# Patient Record
Sex: FEMALE | Race: OTHER | Employment: UNEMPLOYED | URBAN - METROPOLITAN AREA
[De-identification: names, ages, dates, MRNs, and addresses within clinical notes are randomized per-mention and may not be internally consistent; named-entity substitution may affect disease eponyms.]

---

## 2024-02-03 ENCOUNTER — APPOINTMENT (OUTPATIENT)
Dept: GENERAL RADIOLOGY | Age: 39
End: 2024-02-03

## 2024-02-03 ENCOUNTER — HOSPITAL ENCOUNTER (EMERGENCY)
Age: 39
Discharge: HOME | End: 2024-02-03

## 2024-02-03 VITALS
OXYGEN SATURATION: 100 % | HEIGHT: 62 IN | BODY MASS INDEX: 27.6 KG/M2 | TEMPERATURE: 98.7 F | WEIGHT: 150 LBS | DIASTOLIC BLOOD PRESSURE: 80 MMHG | RESPIRATION RATE: 16 BRPM | SYSTOLIC BLOOD PRESSURE: 104 MMHG | HEART RATE: 103 BPM

## 2024-02-03 DIAGNOSIS — R07.89 CHEST WALL PAIN: Primary | ICD-10-CM

## 2024-02-03 DIAGNOSIS — K21.9 GASTROESOPHAGEAL REFLUX DISEASE WITHOUT ESOPHAGITIS: ICD-10-CM

## 2024-02-03 LAB
ALBUMIN SERPL-MCNC: 3.7 G/DL (ref 3.5–5)
ALBUMIN/GLOB SERPL: 0.8 (ref 0.4–1.6)
ALP SERPL-CCNC: 96 U/L (ref 50–136)
ALT SERPL-CCNC: 56 U/L (ref 12–65)
ANION GAP SERPL CALC-SCNC: 2 MMOL/L (ref 2–11)
APPEARANCE UR: NORMAL
AST SERPL-CCNC: 23 U/L (ref 15–37)
BASOPHILS # BLD: 0.1 K/UL (ref 0–0.2)
BASOPHILS NFR BLD: 1 % (ref 0–2)
BILIRUB SERPL-MCNC: 0.3 MG/DL (ref 0.2–1.1)
BILIRUB UR QL: NEGATIVE
BUN SERPL-MCNC: 9 MG/DL (ref 6–23)
CALCIUM SERPL-MCNC: 8.7 MG/DL (ref 8.3–10.4)
CHLORIDE SERPL-SCNC: 111 MMOL/L (ref 103–113)
CO2 SERPL-SCNC: 26 MMOL/L (ref 21–32)
COLOR UR: NORMAL
CREAT SERPL-MCNC: 0.7 MG/DL (ref 0.6–1)
D DIMER PPP FEU-MCNC: <0.27 UG/ML(FEU)
DIFFERENTIAL METHOD BLD: ABNORMAL
EKG ATRIAL RATE: 91 BPM
EKG DIAGNOSIS: NORMAL
EKG P AXIS: 65 DEGREES
EKG P-R INTERVAL: 154 MS
EKG Q-T INTERVAL: 360 MS
EKG QRS DURATION: 80 MS
EKG QTC CALCULATION (BAZETT): 442 MS
EKG R AXIS: 93 DEGREES
EKG T AXIS: 63 DEGREES
EKG VENTRICULAR RATE: 91 BPM
EOSINOPHIL # BLD: 0.1 K/UL (ref 0–0.8)
EOSINOPHIL NFR BLD: 1 % (ref 0.5–7.8)
ERYTHROCYTE [DISTWIDTH] IN BLOOD BY AUTOMATED COUNT: 13.5 % (ref 11.9–14.6)
GLOBULIN SER CALC-MCNC: 4.4 G/DL (ref 2.8–4.5)
GLUCOSE SERPL-MCNC: 110 MG/DL (ref 65–100)
GLUCOSE UR STRIP.AUTO-MCNC: NEGATIVE MG/DL
HCG UR QL: NEGATIVE
HCT VFR BLD AUTO: 43.1 % (ref 35.8–46.3)
HGB BLD-MCNC: 14 G/DL (ref 11.7–15.4)
HGB UR QL STRIP: NEGATIVE
IMM GRANULOCYTES # BLD AUTO: 0 K/UL (ref 0–0.5)
IMM GRANULOCYTES NFR BLD AUTO: 0 % (ref 0–5)
KETONES UR QL STRIP.AUTO: NEGATIVE MG/DL
LEUKOCYTE ESTERASE UR QL STRIP.AUTO: NEGATIVE
LIPASE SERPL-CCNC: 108 U/L (ref 73–393)
LYMPHOCYTES # BLD: 3.4 K/UL (ref 0.5–4.6)
LYMPHOCYTES NFR BLD: 32 % (ref 13–44)
MCH RBC QN AUTO: 27.8 PG (ref 26.1–32.9)
MCHC RBC AUTO-ENTMCNC: 32.5 G/DL (ref 31.4–35)
MCV RBC AUTO: 85.5 FL (ref 82–102)
MONOCYTES # BLD: 0.7 K/UL (ref 0.1–1.3)
MONOCYTES NFR BLD: 6 % (ref 4–12)
NEUTS SEG # BLD: 6.4 K/UL (ref 1.7–8.2)
NEUTS SEG NFR BLD: 60 % (ref 43–78)
NITRITE UR QL STRIP.AUTO: NEGATIVE
NRBC # BLD: 0 K/UL (ref 0–0.2)
PH UR STRIP: 5 (ref 5–9)
PLATELET # BLD AUTO: 528 K/UL (ref 150–450)
PMV BLD AUTO: 10.7 FL (ref 9.4–12.3)
POTASSIUM SERPL-SCNC: 3.6 MMOL/L (ref 3.5–5.1)
PROT SERPL-MCNC: 8.1 G/DL (ref 6.3–8.2)
PROT UR STRIP-MCNC: NEGATIVE MG/DL
RBC # BLD AUTO: 5.04 M/UL (ref 4.05–5.2)
SODIUM SERPL-SCNC: 139 MMOL/L (ref 136–146)
SP GR UR REFRACTOMETRY: 1.02 (ref 1–1.02)
TROPONIN I SERPL HS-MCNC: <3 PG/ML (ref 0–14)
UROBILINOGEN UR QL STRIP.AUTO: 0.2 EU/DL (ref 0.2–1)
WBC # BLD AUTO: 10.8 K/UL (ref 4.3–11.1)

## 2024-02-03 PROCEDURE — 93010 ELECTROCARDIOGRAM REPORT: CPT | Performed by: INTERNAL MEDICINE

## 2024-02-03 PROCEDURE — 84484 ASSAY OF TROPONIN QUANT: CPT

## 2024-02-03 PROCEDURE — 85025 COMPLETE CBC W/AUTO DIFF WBC: CPT

## 2024-02-03 PROCEDURE — 6360000002 HC RX W HCPCS: Performed by: PHYSICIAN ASSISTANT

## 2024-02-03 PROCEDURE — 99285 EMERGENCY DEPT VISIT HI MDM: CPT

## 2024-02-03 PROCEDURE — 6370000000 HC RX 637 (ALT 250 FOR IP): Performed by: PHYSICIAN ASSISTANT

## 2024-02-03 PROCEDURE — 71046 X-RAY EXAM CHEST 2 VIEWS: CPT

## 2024-02-03 PROCEDURE — 81003 URINALYSIS AUTO W/O SCOPE: CPT

## 2024-02-03 PROCEDURE — 85379 FIBRIN DEGRADATION QUANT: CPT

## 2024-02-03 PROCEDURE — 96374 THER/PROPH/DIAG INJ IV PUSH: CPT

## 2024-02-03 PROCEDURE — 81025 URINE PREGNANCY TEST: CPT

## 2024-02-03 PROCEDURE — 80053 COMPREHEN METABOLIC PANEL: CPT

## 2024-02-03 PROCEDURE — 83690 ASSAY OF LIPASE: CPT

## 2024-02-03 PROCEDURE — 93005 ELECTROCARDIOGRAM TRACING: CPT | Performed by: EMERGENCY MEDICINE

## 2024-02-03 RX ORDER — OMEPRAZOLE 20 MG/1
20 CAPSULE, DELAYED RELEASE ORAL
Qty: 30 CAPSULE | Refills: 0 | Status: SHIPPED | OUTPATIENT
Start: 2024-02-03

## 2024-02-03 RX ORDER — FAMOTIDINE 20 MG/1
20 TABLET, FILM COATED ORAL
Status: COMPLETED | OUTPATIENT
Start: 2024-02-03 | End: 2024-02-03

## 2024-02-03 RX ORDER — KETOROLAC TROMETHAMINE 30 MG/ML
30 INJECTION, SOLUTION INTRAMUSCULAR; INTRAVENOUS
Status: COMPLETED | OUTPATIENT
Start: 2024-02-03 | End: 2024-02-03

## 2024-02-03 RX ADMIN — KETOROLAC TROMETHAMINE 30 MG: 30 INJECTION, SOLUTION INTRAMUSCULAR; INTRAVENOUS at 12:07

## 2024-02-03 RX ADMIN — FAMOTIDINE 20 MG: 20 TABLET, FILM COATED ORAL at 12:47

## 2024-02-03 ASSESSMENT — PAIN DESCRIPTION - LOCATION: LOCATION: CHEST

## 2024-02-03 ASSESSMENT — PAIN SCALES - GENERAL
PAINLEVEL_OUTOF10: 6
PAINLEVEL_OUTOF10: 8

## 2024-02-03 ASSESSMENT — LIFESTYLE VARIABLES
HOW MANY STANDARD DRINKS CONTAINING ALCOHOL DO YOU HAVE ON A TYPICAL DAY: PATIENT DOES NOT DRINK
HOW OFTEN DO YOU HAVE A DRINK CONTAINING ALCOHOL: NEVER

## 2024-02-03 ASSESSMENT — PAIN DESCRIPTION - DESCRIPTORS: DESCRIPTORS: ACHING

## 2024-02-03 ASSESSMENT — PAIN - FUNCTIONAL ASSESSMENT: PAIN_FUNCTIONAL_ASSESSMENT: NONE - DENIES PAIN

## 2024-02-03 NOTE — ED PROVIDER NOTES
Emergency Department Provider Note       PCP: No primary care provider on file.   Age: 38 y.o.   Sex: female     DISPOSITION Decision To Discharge 02/03/2024 12:36:52 PM       ICD-10-CM    1. Chest wall pain  R07.89       2. Gastroesophageal reflux disease without esophagitis  K21.9           Medical Decision Making     Complexity of Problems Addressed:  1 or more acute illnesses that pose a threat to life or bodily function.     Data Reviewed and Analyzed:   I independently ordered and reviewed each unique test.         I independently ordered and interpreted the ED EKG in the absence of a Cardiologist.    Rate: 91  EKG Interpretation: EKG Interpretation: sinus rhythm, no evidence of arrhythmia  ST Segments: Normal ST segments - NO STEMI      I interpreted the X-rays chest x ray negative.  I interpreted the CBC CMP lipase D-dimer urinalysis pregnancy test and troponin all normal.    Discussion of management or test interpretation.  38-year-old female with what appears to be musculoskeletal chest pain and possibly some mild gastritis.  She was given Toradol IV in the ER with some relief of her pain she was also given oral Pepcid.  Will give prescription for daily Prilosec strict return to ED precautions and referral to primary care           Risk of Complications and/or Morbidity of Patient Management:  Prescription drug management performed.        Is this patient to be included in the SEP-1 core measure due to severe sepsis or septic shock? No Exclusion criteria - the patient is NOT to be included for SEP-1 Core Measure due to: Infection is not suspected      History      Patient to ER complaining of midsternal chest pain/burning for the past week.  Worse with deep breathing and motion.  She is using Tylenol with minimal relief.  She denies any cough congestion no fever.  She denies any hormone use smoking or recent travel.  She states she will have some mild upper stomach discomfort when she eats.  She denies

## 2024-02-03 NOTE — ED TRIAGE NOTES
Pt arrives to ER with c/o chest pain x week. Pt states that pain originates in chest and travels up into the neck. No cardiac hx.

## 2024-02-03 NOTE — DISCHARGE INSTR - COC
Continuity of Care Form    Patient Name: Dolores Hollis   :  1985  MRN:  [id number]    Admit date:  2/3/2024  Discharge date:  ***    Code Status Order: No Order   Advance Directives:     Admitting Physician:  No admitting provider for patient encounter.  PCP: No primary care provider on file.    Discharging Nurse: ***  Discharging Hospital Unit/Room#: ITR5/T05  Discharging Unit Phone Number: ***    Emergency Contact:   Extended Emergency Contact Information  Primary Emergency Contact: DustinJavan  Mobile Phone: [phone]  Relation: Friend   needed? Yes    Past Surgical History:  No past surgical history on file.    Immunization History:     There is no immunization history on file for this patient.    Active Problems:  Patient Active Problem List   Diagnosis Code    Chest wall pain R07.89    Gastroesophageal reflux disease without esophagitis K21.9       Isolation/Infection:   Isolation            No Isolation          Patient Infection Status       None to display            Nurse Assessment:  Last Vital Signs: /60   Pulse 90   Temp 98.7 °F (37.1 °C) (Oral)   Resp 16   Ht 1.575 m (5' 2\")   Wt 68 kg (150 lb)   SpO2 99%   BMI 27.44 kg/m²     Last documented pain score (0-10 scale): Pain Level: 8  Last Weight:   Wt Readings from Last 1 Encounters:   24 68 kg (150 lb)     Mental Status:  {IP PT MENTAL STATUS:79266}    IV Access:  { DAVID IV ACCESS:677423282}    Nursing Mobility/ADLs:  Walking   {CHP DME ADLs:131658245}  Transfer  {CHP DME ADLs:853968169}  Bathing  {CHP DME ADLs:250432279}  Dressing  {CHP DME ADLs:339799267}  Toileting  {CHP DME ADLs:617742502}  Feeding  {CHP DME ADLs:264374614}  Med Admin  {CHP DME ADLs:403416080}  Med Delivery   { DAVID MED Delivery:582161884}    Wound Care Documentation and Therapy:        Elimination:  Continence:   Bowel: {YES / NO:}  Bladder: {YES / NO:}  Urinary Catheter: {Urinary Catheter:457449967}    Colostomy/Ileostomy/Ileal Conduit: {YES / NO:29869}       Date of Last BM: ***  No intake or output data in the 24 hours ending 24 1240  No intake/output data recorded.    Safety Concerns:     { DAVID Safety Concerns:398133693}    Impairments/Disabilities:      { DAVID Impairments/Disabilities:631032885}    Nutrition Therapy:  Current Nutrition Therapy:   { DAVID Diet List:986931257}    Routes of Feeding: {[facility] DME Other Feedings:084562131}  Liquids: {Slp liquid thickness:88954}  Daily Fluid Restriction: {[facility] DME Yes amt example:465496667}  Last Modified Barium Swallow with Video (Video Swallowing Test): {Done Not Done Date:047043613}    Treatments at the Time of Hospital Discharge:   Respiratory Treatments: ***  Oxygen Therapy:  {Therapy; copd oxygen:32009}  Ventilator:    { CC Vent List:852611713}    Rehab Therapies: {THERAPEUTIC INTERVENTION:0941277461}  Weight Bearing Status/Restrictions: {[facility] Weight Bearin}  Other Medical Equipment (for information only, NOT a DME order):  {EQUIPMENT:856466389}  Other Treatments: ***    Patient's personal belongings (please select all that are sent with patient):  {[facility] DME Belongings:248035822}    RN SIGNATURE:  {Esignature:791736761}    CASE MANAGEMENT/SOCIAL WORK SECTION    Inpatient Status Date: ***    Readmission Risk Assessment Score:  Readmission Risk              Risk of Unplanned Readmission:  0           Discharging to Facility/ Agency   Name:   Address:  Phone:  Fax:    Dialysis Facility (if applicable)   Name:  Address:  Dialysis Schedule:  Phone:  Fax:    / signature: {Esignature:872661928}    PHYSICIAN SECTION    Prognosis: {Prognosis:2343783242}    Condition at Discharge: { Patient Condition:958522220}    Rehab Potential (if transferring to Rehab): {Prognosis:6199114559}    Recommended Labs or Other Treatments After Discharge: ***    Physician Certification: I certify the above information and transfer of Dolores

## 2024-02-03 NOTE — ED NOTES
I have reviewed discharge instructions with the patient.  The patient verbalized understanding.    Patient left ED via Discharge Method: ambulatory to Home with family.    Opportunity for questions and clarification provided.       Patient given 1 scripts.         To continue your aftercare when you leave the hospital, you may receive an automated call from our care team to check in on how you are doing.  This is a free service and part of our promise to provide the best care and service to meet your aftercare needs.” If you have questions, or wish to unsubscribe from this service please call [phone].  Thank you for Choosing our [facility] Emergency Department.        Audrey Waller LPN  02/03/24 1257

## 2024-02-03 NOTE — DISCHARGE INSTRUCTIONS
Use prilosec 1 pill in am for pain along with tylenol daily call primary md office of choice or call number below to arrange follow up appt    We would love to help you get a primary care doctor for follow-up after your emergency department visit.    Please call [phone] between 7AM - 6PM Monday to Friday.  A care navigator will be able to assist you with setting up a doctor close to your home.

## 2025-06-27 ENCOUNTER — TRANSCRIBE ORDERS (OUTPATIENT)
Facility: HOSPITAL | Age: 40
End: 2025-06-27

## 2025-06-27 ENCOUNTER — HOSPITAL ENCOUNTER (OUTPATIENT)
Dept: PHYSICAL THERAPY | Age: 40
Setting detail: RECURRING SERIES
Discharge: HOME | End: 2025-06-29
Payer: COMMERCIAL

## 2025-06-27 DIAGNOSIS — M25.512 CHRONIC LEFT SHOULDER PAIN: Primary | ICD-10-CM

## 2025-06-27 DIAGNOSIS — G89.29 CHRONIC LEFT SHOULDER PAIN: Primary | ICD-10-CM

## 2025-06-27 DIAGNOSIS — S19.9XXA INJURY OF NECK, INITIAL ENCOUNTER: Primary | ICD-10-CM

## 2025-06-27 DIAGNOSIS — M54.59 OTHER LOW BACK PAIN: ICD-10-CM

## 2025-06-27 DIAGNOSIS — M62.81 MUSCLE WEAKNESS (GENERALIZED): ICD-10-CM

## 2025-06-27 DIAGNOSIS — M54.2 NECK PAIN: ICD-10-CM

## 2025-06-27 PROCEDURE — 97110 THERAPEUTIC EXERCISES: CPT

## 2025-06-27 PROCEDURE — 97162 PT EVAL MOD COMPLEX 30 MIN: CPT

## 2025-06-27 ASSESSMENT — PAIN SCALES - GENERAL: PAINLEVEL_OUTOF10: 9

## 2025-06-27 NOTE — CONSULTS
Session ID: [id number]  Session Duration: Longer than 53 minutes  Language: Azeri   ID: #[id number]   Name: David

## 2025-06-27 NOTE — PROGRESS NOTES
Dolores Med Hollis  : 1985  Primary: [facility] ([employer])  Secondary:  [facility] @ [place] [address]  [facility] [address]  Phone: [phone]  Fax: [phone] Plan Frequency: 2x/week until end POC  Plan of Care/Certification Expiration Date: 25            Plan of Care/Certification Expiration Date:  Plan of Care/Certification Expiration Date: 25    Frequency/Duration:   Plan Frequency: 2x/week until end POC      Time In/Out:   Time In: 1105  Time Out: 1219       PT Visit Info:    Plan Frequency: 2x/week until end POC  Total # of Visits to Date: 1  Progress Note Counter: 1      Visit Count:  1    OUTPATIENT PHYSICAL THERAPY:   Treatment Note 2025       Charge Capture        Episode  (L Shoulder Pain)               Treatment Diagnosis:    Chronic left shoulder pain  Neck pain  Other low back pain  Muscle weakness (generalized)  Medical/Referring Diagnosis:   Other specified disorders of tendon, left shoulder  Impingement syndrome of left shoulder  Bicipital tendinitis, left shoulder   Referring Physician: Chandrika Ruiz PA-C MD Orders: PT Eval and Treat   Return MD Appt:  TBD by pt   Date of Onset: Onset Date: 24    Allergies: Patient has no known allergies.  Interventions Planned (Treatment may consist of any combination of the following): See Assessment Note    Restrictions/Precautions/Relevant PMH: none for PT     Subjective Comments: See Evaluation Note from today.    Initial Pain Level:     9/10   Post Session Pain Level:       9/10     Medications Last Reviewed: 2025    Updated Objective Findings: See Evaluation Note from today    Treatment     THERAPEUTIC ACTIVITY: (0 minutes): Therapeutic activities per grid below to improve mobility, strength and balance. Required visual, verbal and tactile cues to promote static and dynamic balance in sitting/standing, as well as coordination of bilateral

## 2025-06-27 NOTE — THERAPY EVALUATION
address the above listed deficits to improve ability to perform pain-free ADLs/IADLs and to improve overall quality of life prior to discharge.    Therapy Problem List: (Impacting functional limitations):    Increased Pain, Decreased Strength, Decreased ROM, Decreased Functional Mobility, Decreased [place] with Home Exercise Program, Decreased Posture, Decreased Body Mechanics, Difficulty Sleeping, and Decreased Activity Tolerance/Endurance*      Therapy Prognosis: Good       Initial Assessment Complexity: Moderate Complexity       PLAN   Effective Dates: 6/27/25 TO Plan of Care/Certification Expiration Date: 09/25/25      Frequency/Duration: Plan Frequency: 2x/week until end POC      Interventions Planned: (Treatment may consist of any combination of the following): Home Exercise Program (HEP), Manual Therapy, Neuromuscular Re-education/Strengthening, Modalities:,   Heat/Cold, and   E-stim - manual, Therapeutic Activites, and Therapeutic Exercise/Strengthening      GOALS: (Goals have been discussed and agreed upon with patient.)  Short-Term Functional Goals: Time Frame: 6 weeks  Patient will demonstrate understanding of a home exercise program independently.  Patient will demonstrate improvement in active left shoulder ABD to > 90 degrees to improve UE function.  Patient will demonstrate improvement in active left shoulder FLEX to > 100 degrees to indicate improved ADL performance.  Patient will demonstrate at least a 10% improvement on the PT questionnaires: Quick DASH in order to show an increase in upper extremity function.       Discharge Goals: Time Frame: 12 weeks    Patient will demonstrate improvement in active left shoulder ABD to > 115 degrees to increase UE function and participation in ADLs.  Patient will demonstrate improvement in active left shoulder FLEX to > 115 degrees to increase UE function and participation in ADLs, in particular, donning and doffing her shirts.  Patient will demonstrate

## 2025-07-08 ENCOUNTER — HOSPITAL ENCOUNTER (OUTPATIENT)
Dept: PHYSICAL THERAPY | Age: 40
Setting detail: RECURRING SERIES
Discharge: HOME | End: 2025-07-10
Payer: COMMERCIAL

## 2025-07-08 PROCEDURE — 97110 THERAPEUTIC EXERCISES: CPT

## 2025-07-08 ASSESSMENT — PAIN SCALES - GENERAL: PAINLEVEL_OUTOF10: 9

## 2025-07-08 NOTE — CONSULTS
Session ID: [id number]  Session Duration: Longer than 54 minutes  Language: Armenian   ID: #[id number]   Name: David

## 2025-07-08 NOTE — PROGRESS NOTES
Dolores Jovel Bunny  : 1985  Primary: [facility] ([employer])  Secondary:  [facility] @ [place] [address]  [facility] [address]  Phone: [phone]  Fax: [phone] Plan Frequency: 2x/week until end POC  Plan of Care/Certification Expiration Date: 25            Plan of Care/Certification Expiration Date:  Plan of Care/Certification Expiration Date: 25    Frequency/Duration:   Plan Frequency: 2x/week until end POC      Time In/Out:   Time In: 1117  Time Out: 1202      PT Visit Info:    Plan Frequency: 2x/week until end POC  Total # of Visits to Date: 2  Progress Note Counter: 2      Visit Count:  2    OUTPATIENT PHYSICAL THERAPY:   Treatment Note 2025       Charge Capture        Episode  (L Shoulder Pain)               Treatment Diagnosis:    Chronic left shoulder pain  Neck pain  Other low back pain  Muscle weakness (generalized)  Medical/Referring Diagnosis:   Other specified disorders of tendon, left shoulder  Impingement syndrome of left shoulder  Bicipital tendinitis, left shoulder   Referring Physician: Chandrika Ruiz PA-C MD Orders: PT Eval and Treat   Return MD Appt:  TBD by pt   Date of Onset: Onset Date: 24    Allergies: Patient has no known allergies.  Interventions Planned (Treatment may consist of any combination of the following): See Assessment Note    Restrictions/Precautions/Relevant PMH: none for PT     Subjective Comments: she has been doing her exercises, they are getting easier. She took a pain pill prior to today's visit and that is helping the pain come down.     Initial Pain Level:     9/10   Post Session Pain Level:       8/10     Medications Last Reviewed: 2025    Updated Objective Findings: None Today    Treatment     THERAPEUTIC ACTIVITY: (0 minutes): Therapeutic activities per grid below to improve mobility, strength and balance. Required visual, verbal and tactile cues to promote

## 2025-07-10 ENCOUNTER — HOSPITAL ENCOUNTER (OUTPATIENT)
Dept: PHYSICAL THERAPY | Age: 40
Setting detail: RECURRING SERIES
Discharge: HOME | End: 2025-07-12
Payer: COMMERCIAL

## 2025-07-10 PROCEDURE — 97110 THERAPEUTIC EXERCISES: CPT

## 2025-07-10 ASSESSMENT — PAIN SCALES - GENERAL: PAINLEVEL_OUTOF10: 9

## 2025-07-10 NOTE — PROGRESS NOTES
Dolores Jovel Bunny  : 1985  Primary: [facility] ([employer])  Secondary:  [facility] @ [place] [address]  [facility] [address]  Phone: [phone]  Fax: [phone] Plan Frequency: 2x/week until end POC  Plan of Care/Certification Expiration Date: 25            Plan of Care/Certification Expiration Date:  Plan of Care/Certification Expiration Date: 25    Frequency/Duration:   Plan Frequency: 2x/week until end POC      Time In/Out:   Time In: 1000  Time Out: 1103      PT Visit Info:    Plan Frequency: 2x/week until end POC  Total # of Visits to Date: 3  Progress Note Counter: 3      Visit Count:  3    OUTPATIENT PHYSICAL THERAPY:   Treatment Note 7/10/2025       Charge Capture        Episode  (L Shoulder Pain)               Treatment Diagnosis:    Chronic left shoulder pain  Neck pain  Other low back pain  Muscle weakness (generalized)  Medical/Referring Diagnosis:   Other specified disorders of tendon, left shoulder  Impingement syndrome of left shoulder  Bicipital tendinitis, left shoulder   Referring Physician: Chandrika Ruiz PA-C MD Orders: PT Eval and Treat   Return MD Appt:  TBD by pt   Date of Onset: Onset Date: 24  Allergies: Patient has no known allergies.  Interventions Planned (Treatment may consist of any combination of the following): See Assessment Note    Restrictions/Precautions/Relevant PMH: none for PT     Subjective Comments: her shoulder is hurting a lot today, she ran out of her pain medications so did not have one prior to PT session.    Initial Pain Level:     9/10   Post Session Pain Level:       9/10     Medications Last Reviewed: 7/10/2025    Updated Objective Findings: None Today    Treatment     THERAPEUTIC ACTIVITY: (0 minutes): Therapeutic activities per grid below to improve mobility, strength and balance. Required visual, verbal and tactile cues to promote static and dynamic balance in

## 2025-07-15 ENCOUNTER — HOSPITAL ENCOUNTER (OUTPATIENT)
Dept: PHYSICAL THERAPY | Age: 40
Setting detail: RECURRING SERIES
Discharge: HOME | End: 2025-07-17
Payer: COMMERCIAL

## 2025-07-15 PROCEDURE — 97110 THERAPEUTIC EXERCISES: CPT

## 2025-07-15 NOTE — PROGRESS NOTES
prevent elbow flex    Isometrics  <<< Sitting: ER and ABD L 1x10 5\"H    Scapular Retractions  Seated: 1x30 Seated: 1x25, 1x20    Seated table slides  Flex: 10 reps   Scap: 10 reps       Rows 20 reps, RTB      Shoulder Extensions 20 reps, RTB      Chin Tucks 20 reps  Supine: 2x10, 2nd set 5\"H - less cues needed for movement, cued to slow down count speed Supine: 2x10, 2nd set 3\"H    Cervical Flexion to stretch cervical paraspinals  3x30\"H OP from RUE - noting it feels good     UT Stretch Manual  3x30\"H L with OP            Education         HEP   Chat& (ChatAnd) Access Code: [email or web address]  Exercises: AAROM ER/IR and ABD in supine  7/10: UT and cervical extensor stretches, scap retractions    MANUAL THERAPY: (0 minutes): Joint mobilization, soft tissue mobilization and manipulation was utilized and necessary because of the patient's restricted joint motion, painful spasm, loss of articular motion and restricted motion of soft tissue.       Treatment/Session Summary:    Treatment Assessment: Pt. Still very tight and in high amount of pain, however is progress with range in UE. Will continue to treat and advance as appropriate.   Communication/Consultation: None today  Equipment provided: HEP  Recommendations/Intent for next treatment session: Next visit will focus on advancements to more challenging activities. Progress repetitions, weight, and complexity of functional movement per pt tolerance and as indicated. Cont to trial TENS to cervical / shoulder region with exercise. Manual as tolerated to cervical/shoulder region.     >Total Treatment Billable Duration: 46 minutes        Guido Panda PTA    Charge Capture  [employer] Portal  Appt Desk  Attendance Report      Future Appointments   Date Time Provider Department Center   7/15/2025 11:00 AM Guido Panda PTA [facility]   2025 11:00 AM Guido Panda PTA [facility]   2025 10:15 AM Guido Panda PTA [facility]   2025 11:00 AM [facility]

## 2025-07-15 NOTE — CONSULTS
Session ID: [id number]  Session Duration: Longer than 54 minutes  Language: Nepali   ID: #[id number]   Name: Jerad

## 2025-07-17 ENCOUNTER — HOSPITAL ENCOUNTER (OUTPATIENT)
Dept: PHYSICAL THERAPY | Age: 40
Setting detail: RECURRING SERIES
Discharge: HOME | End: 2025-07-19
Payer: COMMERCIAL

## 2025-07-17 PROCEDURE — 97110 THERAPEUTIC EXERCISES: CPT

## 2025-07-17 NOTE — PROGRESS NOTES
Dolores Mcconnellverna Hollis  : 1985  Primary: [facility] ([employer])  Secondary:  [facility] @ [place] [address]  [facility] [address]  Phone: [phone]  Fax: [phone] Plan Frequency: 2x/week until end POC  Plan of Care/Certification Expiration Date: 25            Plan of Care/Certification Expiration Date:  Plan of Care/Certification Expiration Date: 25    Frequency/Duration:   Plan Frequency: 2x/week until end POC      Time In/Out:      8571-3943    PT Visit Info:    Plan Frequency: 2x/week until end POC  Total # of Visits to Date: 5  Progress Note Counter: 5      Visit Count:  5    OUTPATIENT PHYSICAL THERAPY:   Treatment Note 2025       Charge Capture        Episode  (L Shoulder Pain)               Treatment Diagnosis:    Chronic left shoulder pain  Neck pain  Other low back pain  Muscle weakness (generalized)  Medical/Referring Diagnosis:   Other specified disorders of tendon, left shoulder  Impingement syndrome of left shoulder  Bicipital tendinitis, left shoulder   Referring Physician: Chandrika Ruiz PA-C MD Orders: PT Eval and Treat   Return MD Appt:  TBD by pt   Date of Onset: Onset Date: 24  Allergies: Patient has no known allergies.  Interventions Planned (Treatment may consist of any combination of the following): See Assessment Note    Restrictions/Precautions/Relevant PMH: none for PT     Subjective Comments: Pt. States she felt some relief after last therapy session, however felt sharp pain last nighit.     Initial Pain Level:     6 /10   Post Session Pain Level:      5 /10     Medications Last Reviewed: 2025    Updated Objective Findings: None Today    Treatment     THERAPEUTIC ACTIVITY: (0 minutes): Therapeutic activities per grid below to improve mobility, strength and balance. Required visual, verbal and tactile cues to promote static and dynamic balance in sitting/standing, as well as

## 2025-07-17 NOTE — CONSULTS
Session ID: [id number]  Session Duration: Longer than 54 minutes  Language: Malay   ID: #[id number]   Name: Becca

## 2025-07-22 ENCOUNTER — HOSPITAL ENCOUNTER (OUTPATIENT)
Dept: PHYSICAL THERAPY | Age: 40
Setting detail: RECURRING SERIES
Discharge: HOME | End: 2025-07-24
Payer: COMMERCIAL

## 2025-07-22 PROCEDURE — 97110 THERAPEUTIC EXERCISES: CPT

## 2025-07-22 NOTE — PROGRESS NOTES
Doloresdevon Hollis  : 1985  Primary: [facility] ([employer])  Secondary:  [facility] @ [place] [address]  [facility] [address]  Phone: [phone]  Fax: [phone] Plan Frequency: 2x/week until end POC  Plan of Care/Certification Expiration Date: 25            Plan of Care/Certification Expiration Date:  Plan of Care/Certification Expiration Date: 25    Frequency/Duration:   Plan Frequency: 2x/week until end POC      Time In/Out:      5764-9715    PT Visit Info:    Plan Frequency: 2x/week until end POC  Total # of Visits to Date: 6  Progress Note Counter: 6      Visit Count:  6    OUTPATIENT PHYSICAL THERAPY:   Treatment Note 2025       Charge Capture        Episode  (L Shoulder Pain)               Treatment Diagnosis:    Chronic left shoulder pain  Neck pain  Other low back pain  Muscle weakness (generalized)  Medical/Referring Diagnosis:   Other specified disorders of tendon, left shoulder  Impingement syndrome of left shoulder  Bicipital tendinitis, left shoulder   Referring Physician: Chandrika Ruiz PA-C MD Orders: PT Eval and Treat   Return MD Appt:  TBD by pt   Date of Onset: Onset Date: 24  Allergies: Patient has no known allergies.  Interventions Planned (Treatment may consist of any combination of the following): See Assessment Note    Restrictions/Precautions/Relevant PMH: none for PT     Subjective Comments: Pt. Reports feeling okay, still in a high level of pain.     Initial Pain Level:     4 /10   Post Session Pain Level:      4 /10     Medications Last Reviewed: 2025    Updated Objective Findings: None Today    Treatment     THERAPEUTIC ACTIVITY: (0 minutes): Therapeutic activities per grid below to improve mobility, strength and balance. Required visual, verbal and tactile cues to promote static and dynamic balance in sitting/standing, as well as coordination of bilateral extremities.

## 2025-07-22 NOTE — CONSULTS
Session ID: [id number]  Session Duration: Longer than 54 minutes  Language: Yoruba   ID: #[id number]   Name: Lucien

## 2025-07-25 ENCOUNTER — HOSPITAL ENCOUNTER (OUTPATIENT)
Dept: PHYSICAL THERAPY | Age: 40
Setting detail: RECURRING SERIES
Discharge: HOME | End: 2025-07-27
Payer: COMMERCIAL

## 2025-07-25 PROCEDURE — 97110 THERAPEUTIC EXERCISES: CPT

## 2025-07-25 NOTE — PROGRESS NOTES
Dolores Hollis  : 1985  Primary: [facility] ([employer])  Secondary:  [facility] @ [place] [address] [place] [address]  Phone: [phone]  Fax: [phone] Plan Frequency: 2x/week until end POC    Plan of Care/Certification Expiration Date: 25              Plan of Care/Certification Expiration Date:  Plan of Care/Certification Expiration Date: 25    Frequency/Duration:   Plan Frequency: 2x/week until end POC      Time In/Out:   Time In: 1100  Time Out: 1140       PT Visit Info:    Plan Frequency: 2x/week until end POC  Total # of Visits to Date: 7  Progress Note Counter: 7      Visit Count:  7                OUTPATIENT PHYSICAL THERAPY:             Progress Report 2025                 Episode (L Shoulder Pain)         Treatment Diagnosis:     Chronic left shoulder pain  Neck pain  Other low back pain  Muscle weakness (generalized)  Medical/Referring Diagnosis:    Other specified disorders of tendon, left shoulder  Impingement syndrome of left shoulder  Bicipital tendinitis, left shoulder    Referring Physician:  Chandrika Ruiz PA-C MD Orders:  PT Eval and Treat   Return MD Appt:  TBD by pt  Date of Onset:  Onset Date: 24  Allergies:  Patient has no known allergies.    Restrictions/Precautions/Relevant PMH:  none for PT       Medications Last Reviewed:  2025       SUBJECTIVE     History of Injury/Illness (Reason for Referral):  25: Dolores Hollis relates she had an accident at work on 2024. 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 She did not have left shoulder pain before this. The pain is in her left occipital region, neck, and shoulder / shoulder blade. She is having numbness in her left hand - all digits, ant/post. She is having trouble sleeping at night as

## 2025-07-25 NOTE — PROGRESS NOTES
Doloresdevon Hollis  : 1985  Primary: [facility] ([employer] [employer])  Secondary:  [facility] [place] [address] [place] Pedroza  [facility] [address]  Phone: [phone]  Fax: [phone] Plan Frequency: 2x/week until end POC  Plan of Care/Certification Expiration Date: 25            Plan of Care/Certification Expiration Date:  Plan of Care/Certification Expiration Date: 25    Frequency/Duration:   Plan Frequency: 2x/week until end POC      Time In/Out:   Time In: 1100  Time Out: 1140      PT Visit Info:    Plan Frequency: 2x/week until end POC  Total # of Visits to Date: 7  Progress Note Counter: 7      Visit Count:  7    OUTPATIENT PHYSICAL THERAPY:   Treatment Note 2025       Charge Capture        Episode  (L Shoulder Pain)               Treatment Diagnosis:    Chronic left shoulder pain  Neck pain  Other low back pain  Muscle weakness (generalized)  Medical/Referring Diagnosis:   Other specified disorders of tendon, left shoulder  Impingement syndrome of left shoulder  Bicipital tendinitis, left shoulder   Referring Physician: Chandrika Ruiz PA-C MD Orders: PT Eval and Treat   Return MD Appt:  TBD by pt   Date of Onset: Onset Date: 24  Allergies: Patient has no known allergies.  Interventions Planned (Treatment may consist of any combination of the following): See Assessment Note    Restrictions/Precautions/Relevant PMH: none for PT     Subjective Comments: Pt reports continue pain 8-10/10    Initial Pain Level:     9 /10   Post Session Pain Level:      9 /10     Medications Last Reviewed: 2025    Updated Objective Findings: See Progress Note from today    Treatment     THERAPEUTIC ACTIVITY: (0 minutes): Therapeutic activities per grid below to improve mobility, strength and balance. Required visual, verbal and tactile cues to promote static and dynamic balance in sitting/standing, as well as coordination of bilateral

## 2025-07-25 NOTE — CONSULTS
Session ID: [id number]  Session Duration: Longer than 54 minutes  Language: Khmer   ID: #[id number]   Name: Bradley

## 2025-08-01 ENCOUNTER — HOSPITAL ENCOUNTER (OUTPATIENT)
Dept: PHYSICAL THERAPY | Age: 40
Setting detail: RECURRING SERIES
Discharge: HOME | End: 2025-08-03
Payer: COMMERCIAL

## 2025-08-01 PROCEDURE — 97140 MANUAL THERAPY 1/> REGIONS: CPT

## 2025-08-01 PROCEDURE — 97110 THERAPEUTIC EXERCISES: CPT

## 2025-08-01 ASSESSMENT — PAIN SCALES - GENERAL: PAINLEVEL_OUTOF10: 8

## 2025-08-06 ENCOUNTER — HOSPITAL ENCOUNTER (OUTPATIENT)
Dept: PHYSICAL THERAPY | Age: 40
Setting detail: RECURRING SERIES
Discharge: HOME | End: 2025-08-08
Payer: COMMERCIAL

## 2025-08-06 PROCEDURE — 97140 MANUAL THERAPY 1/> REGIONS: CPT

## 2025-08-06 PROCEDURE — 97110 THERAPEUTIC EXERCISES: CPT

## 2025-08-06 ASSESSMENT — PAIN SCALES - GENERAL: PAINLEVEL_OUTOF10: 7

## 2025-08-08 ENCOUNTER — HOSPITAL ENCOUNTER (OUTPATIENT)
Dept: PHYSICAL THERAPY | Age: 40
Setting detail: RECURRING SERIES
Discharge: HOME | End: 2025-08-10
Payer: COMMERCIAL

## 2025-08-08 PROCEDURE — 97110 THERAPEUTIC EXERCISES: CPT

## 2025-08-08 PROCEDURE — 97140 MANUAL THERAPY 1/> REGIONS: CPT

## 2025-08-11 ENCOUNTER — HOSPITAL ENCOUNTER (OUTPATIENT)
Dept: PHYSICAL THERAPY | Age: 40
Setting detail: RECURRING SERIES
Discharge: HOME | End: 2025-08-13
Payer: COMMERCIAL

## 2025-08-11 PROCEDURE — 97140 MANUAL THERAPY 1/> REGIONS: CPT

## 2025-08-11 PROCEDURE — 97110 THERAPEUTIC EXERCISES: CPT

## 2025-08-11 ASSESSMENT — PAIN SCALES - GENERAL: PAINLEVEL_OUTOF10: 6
